# Patient Record
Sex: FEMALE | Race: WHITE | Employment: OTHER | URBAN - METROPOLITAN AREA
[De-identification: names, ages, dates, MRNs, and addresses within clinical notes are randomized per-mention and may not be internally consistent; named-entity substitution may affect disease eponyms.]

---

## 2017-01-09 ENCOUNTER — APPOINTMENT (OUTPATIENT)
Dept: LAB | Age: 32
End: 2017-01-09
Attending: OBSTETRICS & GYNECOLOGY
Payer: COMMERCIAL

## 2017-01-09 ENCOUNTER — TELEPHONE (OUTPATIENT)
Dept: OBGYN CLINIC | Facility: CLINIC | Age: 32
End: 2017-01-09

## 2017-01-09 ENCOUNTER — OFFICE VISIT (OUTPATIENT)
Dept: OBGYN CLINIC | Facility: CLINIC | Age: 32
End: 2017-01-09

## 2017-01-09 VITALS — WEIGHT: 127 LBS | DIASTOLIC BLOOD PRESSURE: 70 MMHG | SYSTOLIC BLOOD PRESSURE: 102 MMHG

## 2017-01-09 DIAGNOSIS — Z32.01 PREGNANCY EXAMINATION OR TEST, POSITIVE RESULT: ICD-10-CM

## 2017-01-09 DIAGNOSIS — O20.0 THREATENED MISCARRIAGE IN EARLY PREGNANCY: ICD-10-CM

## 2017-01-09 DIAGNOSIS — N92.6 MISSED MENSES: Primary | ICD-10-CM

## 2017-01-09 LAB
B-HCG SERPL-ACNC: 500.8 MIU/ML
CONTROL LINE PRESENT WITH A CLEAR BACKGROUND (YES/NO): YES YES/NO
KIT LOT #: NORMAL NUMERIC
PROGEST SERPL-MCNC: 4 NG/ML

## 2017-01-09 PROCEDURE — 81025 URINE PREGNANCY TEST: CPT | Performed by: OBSTETRICS & GYNECOLOGY

## 2017-01-09 PROCEDURE — 84144 ASSAY OF PROGESTERONE: CPT

## 2017-01-09 PROCEDURE — 99214 OFFICE O/P EST MOD 30 MIN: CPT | Performed by: OBSTETRICS & GYNECOLOGY

## 2017-01-09 PROCEDURE — 84702 CHORIONIC GONADOTROPIN TEST: CPT

## 2017-01-09 PROCEDURE — 36415 COLL VENOUS BLD VENIPUNCTURE: CPT

## 2017-01-09 PROCEDURE — 76817 TRANSVAGINAL US OBSTETRIC: CPT | Performed by: OBSTETRICS & GYNECOLOGY

## 2017-01-09 NOTE — PROGRESS NOTES
[facility], [facility]  Obstetrics and Gynecology  Focused Gynecology Problem Exam  Kris Marcelino MD    Ale Watkins is a 32year old female presenting for Gyn Exam  .    HPI:   Patient presents with:  Gyn Exam: possible missed ab, pos upt at home patient Pelvic ultrasound: Normal size uterus with endometrium measuring approximately 1.4 cm. There is a small fluid collection measuring 3 x 3 mm which could be an early gestational sac versus a fluid collection.   There is no yolk sac or fetal pole visualiz None     Scott Garcia MD  1/9/2017  11:10 AM

## 2017-01-10 NOTE — TELEPHONE ENCOUNTER
Patient notified of her chest level and baby 100 patient counseled the likelihood based on her clinical history and physical exam with  Counseled patient rule out the possibility of an early normal pregnancy based on her beta hCG level but her progesterone

## 2017-01-12 ENCOUNTER — APPOINTMENT (OUTPATIENT)
Dept: LAB | Age: 32
End: 2017-01-12
Attending: OBSTETRICS & GYNECOLOGY
Payer: COMMERCIAL

## 2017-01-12 DIAGNOSIS — O20.0 THREATENED MISCARRIAGE IN EARLY PREGNANCY: ICD-10-CM

## 2017-01-12 LAB — B-HCG SERPL-ACNC: 56.9 MIU/ML

## 2017-01-12 PROCEDURE — 84702 CHORIONIC GONADOTROPIN TEST: CPT

## 2017-01-12 PROCEDURE — 36415 COLL VENOUS BLD VENIPUNCTURE: CPT

## 2017-01-15 ENCOUNTER — TELEPHONE (OUTPATIENT)
Dept: OBGYN UNIT | Facility: HOSPITAL | Age: 32
End: 2017-01-15

## 2017-01-15 DIAGNOSIS — O20.0 THREATENED ABORTION: ICD-10-CM

## 2017-01-15 DIAGNOSIS — Z56.2 THREAT OF JOB LOSS: Primary | ICD-10-CM

## 2017-01-15 SDOH — ECONOMIC STABILITY - INCOME SECURITY: THREAT OF JOB LOSS: Z56.2

## 2017-01-15 NOTE — TELEPHONE ENCOUNTER
Please contact patient to set up a repeat beta hCG approximately January 19. [facility]. I discussed that the drop in beta hCG was consistent with a probable complete miscarriage.   Patient currently is without

## 2017-01-16 ENCOUNTER — APPOINTMENT (OUTPATIENT)
Dept: LAB | Age: 32
End: 2017-01-16
Attending: OBSTETRICS & GYNECOLOGY
Payer: COMMERCIAL

## 2017-01-16 DIAGNOSIS — O20.0 THREATENED ABORTION: ICD-10-CM

## 2017-01-16 LAB — B-HCG SERPL-ACNC: 9.2 MIU/ML

## 2017-01-16 PROCEDURE — 84702 CHORIONIC GONADOTROPIN TEST: CPT

## 2017-01-16 PROCEDURE — 36415 COLL VENOUS BLD VENIPUNCTURE: CPT

## 2017-01-23 ENCOUNTER — OFFICE VISIT (OUTPATIENT)
Dept: OBGYN CLINIC | Facility: CLINIC | Age: 32
End: 2017-01-23

## 2017-01-23 VITALS
SYSTOLIC BLOOD PRESSURE: 102 MMHG | WEIGHT: 128 LBS | BODY MASS INDEX: 21.33 KG/M2 | HEIGHT: 65 IN | DIASTOLIC BLOOD PRESSURE: 60 MMHG

## 2017-01-23 DIAGNOSIS — O03.9 COMPLETE ABORTION: Primary | ICD-10-CM

## 2017-01-23 PROCEDURE — 99212 OFFICE O/P EST SF 10 MIN: CPT | Performed by: OBSTETRICS & GYNECOLOGY

## 2017-01-23 NOTE — PROGRESS NOTES
[facility], [facility]  Obstetrics and Gynecology  Focused Gynecology Problem Exam  Meryl Juárez MD    Lore Ballard is a 32year old female presenting for Gyn Exam  .    HPI:   Patient presents with:  Gyn Exam: follow up to missed ab    Patient here for (58.06 kg)  BMI 21.30 kg/m2  LMP 01/03/2015    GENERAL: well developed, well nourished, in no apparent distress  ABDOMEN: Soft, non distended; non tender, no masses  In office UCG was negative today   ASSESSMENT:    A: 32 y.o. [id number] s/p Spontaneous AB-pr

## 2017-03-09 ENCOUNTER — OFFICE VISIT (OUTPATIENT)
Dept: OBGYN CLINIC | Facility: CLINIC | Age: 32
End: 2017-03-09

## 2017-03-09 VITALS
SYSTOLIC BLOOD PRESSURE: 134 MMHG | BODY MASS INDEX: 20 KG/M2 | DIASTOLIC BLOOD PRESSURE: 78 MMHG | HEART RATE: 69 BPM | WEIGHT: 121 LBS

## 2017-03-09 DIAGNOSIS — N92.6 MISSED MENSES: ICD-10-CM

## 2017-03-09 DIAGNOSIS — Z32.01 PREGNANCY EXAMINATION OR TEST, POSITIVE RESULT: Primary | ICD-10-CM

## 2017-03-09 LAB
CONTROL LINE PRESENT WITH A CLEAR BACKGROUND (YES/NO): YES YES/NO
KIT LOT #: NORMAL NUMERIC
PREGNANCY TEST, URINE: POSITIVE

## 2017-03-09 PROCEDURE — 99214 OFFICE O/P EST MOD 30 MIN: CPT | Performed by: OBSTETRICS & GYNECOLOGY

## 2017-03-09 PROCEDURE — 81025 URINE PREGNANCY TEST: CPT | Performed by: OBSTETRICS & GYNECOLOGY

## 2017-03-09 PROCEDURE — 76817 TRANSVAGINAL US OBSTETRIC: CPT | Performed by: OBSTETRICS & GYNECOLOGY

## 2017-03-10 NOTE — PROGRESS NOTES
[facility], [facility]  Obstetrics and Gynecology  Pregnancy Confirmation  Geovanny Wright MD    HPI     Shaheen Mcdaniel is a 32year old [id number] with Patient's last menstrual period was 01/03/2015. who presents for pregnancy confirmation.      Patient had a po /78 mmHg  Pulse 69  Wt 121 lb (54.885 kg)  LMP 01/03/2015  Breastfeeding?  No    GENERAL: well developed, well nourished, in no apparent distress  ABDOMEN: Soft, non distended; non tender, no masses  GYNE/: External Genitalia: Normal appearing, no

## 2017-03-16 ENCOUNTER — LAB ENCOUNTER (OUTPATIENT)
Dept: LAB | Age: 32
End: 2017-03-16
Attending: OBSTETRICS & GYNECOLOGY
Payer: COMMERCIAL

## 2017-03-16 ENCOUNTER — NURSE ONLY (OUTPATIENT)
Dept: OBGYN CLINIC | Facility: CLINIC | Age: 32
End: 2017-03-16

## 2017-03-16 DIAGNOSIS — Z34.81 OTHER NORMAL PREGNANCY, NOT FIRST, FIRST TRIMESTER: ICD-10-CM

## 2017-03-16 DIAGNOSIS — Z34.81 OTHER NORMAL PREGNANCY, NOT FIRST, FIRST TRIMESTER: Primary | ICD-10-CM

## 2017-03-16 LAB
ANTIBODY SCREEN: NEGATIVE
BASOPHILS # BLD: 0.1 K/UL (ref 0–0.2)
BASOPHILS NFR BLD: 1 %
EOSINOPHIL # BLD: 0 K/UL (ref 0–0.7)
EOSINOPHIL NFR BLD: 0 %
ERYTHROCYTE [DISTWIDTH] IN BLOOD BY AUTOMATED COUNT: 13.2 % (ref 11–15)
HCT VFR BLD AUTO: 35.3 % (ref 35–48)
HGB BLD-MCNC: 11.9 G/DL (ref 12–16)
LYMPHOCYTES # BLD: 1.5 K/UL (ref 1–4)
LYMPHOCYTES NFR BLD: 19 %
MCH RBC QN AUTO: 30 PG (ref 27–32)
MCHC RBC AUTO-ENTMCNC: 33.7 G/DL (ref 32–37)
MCV RBC AUTO: 89 FL (ref 80–100)
MONOCYTES # BLD: 0.3 K/UL (ref 0–1)
MONOCYTES NFR BLD: 3 %
NEUTROPHILS # BLD AUTO: 6.2 K/UL (ref 1.8–7.7)
NEUTROPHILS NFR BLD: 76 %
PLATELET # BLD AUTO: 219 K/UL (ref 140–400)
PMV BLD AUTO: 8.3 FL (ref 7.4–10.3)
RBC # BLD AUTO: 3.97 M/UL (ref 3.7–5.4)
RH BLOOD TYPE: POSITIVE
RUBV IGG SER-ACNC: 80.8 IU/ML
WBC # BLD AUTO: 8.1 K/UL (ref 4–11)

## 2017-03-16 PROCEDURE — 86850 RBC ANTIBODY SCREEN: CPT

## 2017-03-16 PROCEDURE — 87389 HIV-1 AG W/HIV-1&-2 AB AG IA: CPT

## 2017-03-16 PROCEDURE — 86762 RUBELLA ANTIBODY: CPT

## 2017-03-16 PROCEDURE — 87340 HEPATITIS B SURFACE AG IA: CPT

## 2017-03-16 PROCEDURE — 86777 TOXOPLASMA ANTIBODY: CPT

## 2017-03-16 PROCEDURE — 36415 COLL VENOUS BLD VENIPUNCTURE: CPT

## 2017-03-16 PROCEDURE — 85025 COMPLETE CBC W/AUTO DIFF WBC: CPT

## 2017-03-16 PROCEDURE — 86901 BLOOD TYPING SEROLOGIC RH(D): CPT

## 2017-03-16 PROCEDURE — 86780 TREPONEMA PALLIDUM: CPT

## 2017-03-16 PROCEDURE — 86900 BLOOD TYPING SEROLOGIC ABO: CPT

## 2017-03-16 NOTE — PROGRESS NOTES
Pt here today for [facility]   Education visit, Educational material reviewed. Pt verbalized understanding. Rx given for pn labs. Added toxoplasmosis due to having a cat. Consents to blood transfusion. Undecided on FTS.

## 2017-03-17 LAB
HBV SURFACE AG SERPL QL IA: NONREACTIVE
HIV1+2 AB SERPL QL IA: NONREACTIVE
T PALLIDUM AB SER QL: NEGATIVE

## 2017-03-18 LAB — TOXOPLASMA GONDII AB, IGG: <3 IU/ML

## 2017-04-20 ENCOUNTER — TELEPHONE (OUTPATIENT)
Dept: OBGYN CLINIC | Facility: CLINIC | Age: 32
End: 2017-04-20

## 2017-04-20 NOTE — TELEPHONE ENCOUNTER
Spoke w/ pt regarding outstanding labs & pt stated she is transferring care to another OB to deliver at [address]

## (undated) NOTE — MR AVS SNAPSHOT
[facility] [address]  [phone]               Thank you for choosing us for your health care visit with Chrissy Mares MD, MD.  We are glad to serve you and happy to provide you with this summa

## (undated) NOTE — MR AVS SNAPSHOT
[address], [address]  [address] [facility]  [phone]               Thank you for choosing us for your health care visit with Johanne Morse MD, MD.  We are glad to serve you and happy to provide you with this Kit Lot # [id number] Numeric    Kit Expiration Date 04/10/2018 Date                  MyChart     Visit [email or web address]  You can access your MyChart to more actively manage your health care and view more details from this visit by going to [email or web address]

## (undated) NOTE — MR AVS SNAPSHOT
[facility]3]  [address] [place] [address] [phone]               Thank you for choosing us for your health care visit with Nurse. We are glad to serve you and happy to provide you with this summary of your visit.   Please help u Assoc Dx: Other normal pregnancy, not first, first trimester [Z34.81]           Toxoplasma Gondii Ab, IGG, Qn [E]    Complete by: Mar 16, 2017 (Approximate)    Assoc Dx:   Other normal pregnancy, not first, first trimester [Z34.81]                 Reason

## (undated) NOTE — MR AVS SNAPSHOT
[facility]  [address] [place] [address], [address] [place] [address]  [phone]               Thank you for choosing us for your health care visit with Kristen Lunsford MD, MD.  We are glad to serve you and happy to provide you with this summa Referral Order Referred to [facility] Phone Visits Status Diagnosis                      Missed menses [550254]           Pregnancy examination or test, positive result [46364]  Threatened miscarriage in early pregnancy [061283]           Threatened mis Water is best for hydration Fast food. Eat at home when possible     Tips for increasing your physical activity – Adults who are physically active are less likely to develop some chronic diseases than adults who are inactive.      HOW TO GET STARTED: HOW